# Patient Record
Sex: MALE | Race: WHITE | NOT HISPANIC OR LATINO | URBAN - METROPOLITAN AREA
[De-identification: names, ages, dates, MRNs, and addresses within clinical notes are randomized per-mention and may not be internally consistent; named-entity substitution may affect disease eponyms.]

---
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
## 2025-07-08 ENCOUNTER — OFFICE VISIT (OUTPATIENT)
Dept: PEDIATRIC NEUROLOGY | Facility: CLINIC | Age: 20
End: 2025-07-08
Payer: COMMERCIAL

## 2025-07-08 VITALS — BODY MASS INDEX: 20.1 KG/M2 | HEIGHT: 70 IN | WEIGHT: 140.44 LBS

## 2025-07-08 DIAGNOSIS — R25.1 TREMOR: ICD-10-CM

## 2025-07-08 DIAGNOSIS — G43.701 CHRONIC MIGRAINE WITHOUT AURA WITH STATUS MIGRAINOSUS, NOT INTRACTABLE: Primary | ICD-10-CM

## 2025-07-08 PROCEDURE — G2211 COMPLEX E/M VISIT ADD ON: HCPCS | Mod: S$GLB,,, | Performed by: STUDENT IN AN ORGANIZED HEALTH CARE EDUCATION/TRAINING PROGRAM

## 2025-07-08 PROCEDURE — 1159F MED LIST DOCD IN RCRD: CPT | Mod: CPTII,S$GLB,, | Performed by: STUDENT IN AN ORGANIZED HEALTH CARE EDUCATION/TRAINING PROGRAM

## 2025-07-08 PROCEDURE — 99999 PR PBB SHADOW E&M-EST. PATIENT-LVL III: CPT | Mod: PBBFAC,,, | Performed by: STUDENT IN AN ORGANIZED HEALTH CARE EDUCATION/TRAINING PROGRAM

## 2025-07-08 PROCEDURE — 1160F RVW MEDS BY RX/DR IN RCRD: CPT | Mod: CPTII,S$GLB,, | Performed by: STUDENT IN AN ORGANIZED HEALTH CARE EDUCATION/TRAINING PROGRAM

## 2025-07-08 PROCEDURE — 3008F BODY MASS INDEX DOCD: CPT | Mod: CPTII,S$GLB,, | Performed by: STUDENT IN AN ORGANIZED HEALTH CARE EDUCATION/TRAINING PROGRAM

## 2025-07-08 PROCEDURE — 99214 OFFICE O/P EST MOD 30 MIN: CPT | Mod: S$GLB,,, | Performed by: STUDENT IN AN ORGANIZED HEALTH CARE EDUCATION/TRAINING PROGRAM

## 2025-07-08 RX ORDER — SUMATRIPTAN SUCCINATE 50 MG/1
50 TABLET ORAL DAILY PRN
Qty: 9 TABLET | Refills: 3 | Status: SHIPPED | OUTPATIENT
Start: 2025-07-08 | End: 2025-08-07

## 2025-07-08 RX ORDER — TOPIRAMATE 50 MG/1
50 TABLET, FILM COATED ORAL 2 TIMES DAILY
Qty: 60 TABLET | Refills: 3 | Status: SHIPPED | OUTPATIENT
Start: 2025-07-08

## 2025-07-08 NOTE — PATIENT INSTRUCTIONS
Acute abortive treatment:     When migraine symptoms first develop, the patient should rest or sleep in a dark, quiet room with a cool cloth applied to forehead if possible. Early use of medication during the migraine attack, when the headache is still mild, is important      Step 1: For mild headaches or as first step in treatment, give ibuprofen solution or tablet 600MG every 4 to 6 hours as needed (max 4 doses in 24 hours)               -Limit to 14 days per month maximum to avoid medication overuse headache               -If this medication proves ineffective, would next try naproxen sodium tablet 375mg every 8 to 12 hours as needed (max daily dose 1000mg)      Step 2: If step 1 medication does not get rid of headache, or if headache is severe from the start, also give sumatriptan 50mg oral tablet              -This dose may be repeated a second time if headache still remains after 2 hours, with maximum of 2 doses per 24 hours               -Limit use to 9 days per month to avoid medication overuse headache               -You may combine this medication with ibuprofen for better effect if it is only somewhat effective               - Side effects may include chest pain/pressure/tightness, hot/cold flashes, sore throat, fatigue, feeling of heaviness, tingling, jaw pain/pressure, neck pain               -If this medication proves ineffective, would next try NURTEC 75mg ODT;         Daily preventive treatment     Given that this patient has frequent or long-lasting migraines, migraines that cause significant disability, contraindication or failure of acute medication, or medication overuse headache, will initiate prevention at this time with:  1) riboflavin (vitamin B2) 200mg twice daily. This may cause stomach upset if taken on empty stomach. It can cause bright yellow or yellow-orange discoloration of urine   2) elemental magnesium or magnesium oxide 200mg twice daily. May cause diarrhea       3) Topiramate  Week  1: Take 1/2 tab (25mg) every morning   Week 2: Take 1/2 tab (25mg) every morning and take 1/2 tab (25mg) every night  Week 3: Take 1 tab (50mg) every morning and take 1/2 tab (25mg) every night  Week 4: Take 1 tab (50mg) every morning and take 1 tab (50mg) every night and continue this dose       They have previously tried 0 other preventive medications which were stopped for either side effects or lack of efficacy               -Should be continued for at least 6-8 weeks before determining effectiveness               -Headache diary should be maintained so that frequency of headaches can be compared once on the medication              -If this proves ineffective or side effects are not tolerated, would next try propanolol      . Side effects may include low heart rate or blood pressure, nightmares, decreased exercise tolerance, or emotional disturbances.                -If medication proves effective, it should be continued for at least 6-12 months before considering to wean medication      Lifestyle measures   Education: Check out [employer] for more education on headaches, a website created by pediatric headache specialists   Sleep: Work on getting sufficient sleep along with keeping relatively constant bedtime and wake-up times on weekdays and weekends  Exercise: Regular exercise for at least 30 minutes a day for 5 days a week may decrease frequency of headaches   Hydration: Aim to drink at least 64 ounces of water every day, ideally 80 ounces. Carry a water bottle around to school to make this easier   Meals: Avoid fasting or skipping meals because this may trigger headaches      Utilize mychart to notify office of side effects, effects of acute medications after 2-3 tries, effects of preventive medications after 6-8 weeks     Return to clinic in 3 months for reassessment

## 2025-07-08 NOTE — PROGRESS NOTES
Subjective:      Patient ID: Chandler White is a 19 y.o. male here for   Chief Complaint   Patient presents with    Migraine        Interim hx 2:     Current HA freq: 15 days out of last 30, with 7 days considered bad/severe  Last HA freq: 30 days out of prior 30d, with 9 days considered bad/severe     Current acute: ibuprofen/rizatriptan  Current preventive: propranolol 40mg BID - stopped few months ago wasn't helpful       Interim hx:   Tremors are same as prior, involving hand. Same triggers as before     30/30 headaches past month - 9 bad;   +Photophobia -phonophobia, rarely nausea - can last 3-4 hours;     Sometimes short bursts of pain in the occipital area;     Sometimes ibuprofen 2-3 tabs or benadryl - only somewhat effective;    Initial HPI:  16yoM with hand tremors since late 2021, possibly before this.     He has a tremor in hands and feet bilaterally. He notices it most when focusing on something, reaching for something slowly, slowly walking down the stairs. He sometimes feels weak when picking up something heavy. Tells a story of picking up a cinderblock and it felt like his arms wanted to give out. Has not noticed this during sleep. No issues with gait. Some issues with sleep.     Mother thinks that he is a bit high strung and wonders if this could be anxiety.     Rare intake of caffeine. Stopped this and vaping nicotine and didn't notice a difference.     No history of seizure.     Has a slight headache when waking up about 4 times per week, always on the R temple. Tension type not throbbing.         Prior labwork by PCP was overall unremarkable.     Brother with pontocerebellar hypoplasia type 2A due to the   homozygous TFEN [id number] mutation.  PCH-2A is a neurodevelopmental disorder with   generalized clonus, incoordination of sucking, swallowing, impaired motor and   cognitive development with lack of voluntary motor development, central visual   impairment and increased risk of malignant hyperthermia.   Epilepsy is present in   approximately 50% of the patients      Birth history: born 37wks  for large head; No issues with pregnancy or delivery   Developmental history: Met all milestones  Family history: pontocerebellar hypoplasia in brother;   Social history: Lives with mother, father, sister, brother   School/therapy history: 11th grade; gets all As;     Current Outpatient Medications   Medication Instructions    ketoconazole (NIZORAL) 2 % cream 1 application , Daily    mupirocin (BACTROBAN) 1 g, 3 times daily    permethrin (ELIMITE) 5 % cream 1 application , Daily    propranoloL (INDERAL) 40 mg, Oral, 2 times daily    rizatriptan (MAXALT) 10 mg, Oral, As needed (PRN)    sulfamethoxazole-trimethoprim 800-160mg (BACTRIM DS) 800-160 mg Tab 1 tablet, 2 times daily    terbinafine HCL (LAMISIL) 250 mg, Daily        Review of Systems   Constitutional:  Negative for fever and unexpected weight change.   HENT:  Negative for hearing loss and trouble swallowing.    Eyes:  Positive for visual disturbance.   Respiratory:  Negative for cough and shortness of breath.    Cardiovascular:  Negative for chest pain and palpitations.   Gastrointestinal:  Negative for abdominal pain, constipation, diarrhea, nausea and vomiting.   Genitourinary:  Negative for difficulty urinating.   Musculoskeletal:  Negative for gait problem and neck pain.   Skin:  Negative for rash.   Allergic/Immunologic: Negative for environmental allergies.   Neurological:  Positive for tremors and headaches. Negative for dizziness, seizures, syncope, speech difficulty, weakness, light-headedness and numbness.   Hematological:  Does not bruise/bleed easily.   Psychiatric/Behavioral:  Positive for sleep disturbance. Negative for confusion. The patient is nervous/anxious.        Objective:   Neurologic Exam     Mental Status   Oriented to person, place, and time.   Registration: recalls 3 of 3 objects. Recall at 5 minutes: recalls 3 of 3 objects. Follows 2  "step commands.   Attention: normal. Concentration: normal.   Speech: speech is normal   Level of consciousness: alert  Knowledge: good.     Cranial Nerves     CN II   Visual fields full to confrontation.     CN III, IV, VI   Pupils are equal, round, and reactive to light.  Extraocular motions are normal.   Nystagmus: none   Diplopia: none    CN V   Facial sensation intact.     CN VII   Facial expression full, symmetric.     CN VIII   Hearing: intact    CN IX, X   Palate: symmetric    CN XI   Right sternocleidomastoid strength: normal  Left sternocleidomastoid strength: normal  Right trapezius strength: normal  Left trapezius strength: normal    CN XII   Tongue deviation: none    Motor Exam   Muscle bulk: normal  Overall muscle tone: normal    Strength   Strength 5/5 throughout.     Sensory Exam   Light touch normal.     Gait, Coordination, and Reflexes     Gait  Gait: normal    Coordination   Finger to nose coordination: normal  Heel to shin coordination: normal  Tandem walking coordination: normal    Tremor   Action tremor: right arm and left arm (fine tremor of hands when outstretched, R>L)    Reflexes   Right brachioradialis: 2+  Left brachioradialis: 2+  Right biceps: 2+  Left biceps: 2+  Right triceps: 2+  Left triceps: 2+  Right patellar: 3+  Left patellar: 3+  Right achilles: 2+  Left achilles: 2+  Right plantar: normal  Left plantar: normal  Right ankle clonus: absent  Left ankle clonus: absent  Ht 5' 9.72" (1.771 m)   Wt 63.7 kg (140 lb 6.9 oz)   BMI 20.31 kg/m²      Physical Exam  Vitals reviewed.   Constitutional:       Appearance: Normal appearance.   HENT:      Head: Normocephalic.      Nose: Nose normal.      Mouth/Throat:      Mouth: Mucous membranes are moist.   Eyes:      Conjunctiva/sclera: Conjunctivae normal.      Pupils: Pupils are equal, round, and reactive to light.   Cardiovascular:      Rate and Rhythm: Normal rate and regular rhythm.   Pulmonary:      Effort: Pulmonary effort is normal. " No respiratory distress.   Abdominal:      General: There is no distension.      Palpations: Abdomen is soft.   Musculoskeletal:         General: No swelling. Normal range of motion.      Cervical back: Normal range of motion. No tenderness.   Skin:     Findings: No rash.   Neurological:      Mental Status: He is alert and oriented to person, place, and time.      Coordination: Finger-Nose-Finger Test normal.      Gait: Gait is intact. Tandem walk normal.      Deep Tendon Reflexes:      Reflex Scores:       Tricep reflexes are 2+ on the right side and 2+ on the left side.       Bicep reflexes are 2+ on the right side and 2+ on the left side.       Brachioradialis reflexes are 2+ on the right side and 2+ on the left side.       Patellar reflexes are 3+ on the right side and 3+ on the left side.       Achilles reflexes are 2+ on the right side and 2+ on the left side.  Psychiatric:         Mood and Affect: Mood normal.         Speech: Speech normal.         Behavior: Behavior normal.       Assessment:     Chandler is a 19 Years old male with no significant PMHx who presents for evaluation of tremor. His tremor is generalized and includes hands and feet, and seems to fit picture for action tremor, either a simple kinetic tremor or perhaps an isometic tremor. He does not clearly fit the picture for essential tremor and there is not a family history of this. He does have a brother with pontocerebellar hypoplasia, possible if he is a carrier may have mild symptoms related to this but imaging was normal. Overall tremor not overly disruptive for patient so will monitor off treatment for now.    This patient meets criteria for a diagnosis of Episodic Migraine w/o aura due to the following:    Recurrent (at least 5) episodes of moderate to severe head pain lasting (2 or more) hours and accompanied by:  - Nausea and/or vomiting  - Photophobia  - Phonophobia     Given that headaches are still frequent and propranolol failed to  help this and tremor, will instead start daily topiramate to help both of these issues and reassess. He has tried a few first line triptans without great response so will also submit for nurtec as acute med given patient over 17yo    Plan:     Plan:     Acute abortive treatment:    When migraine symptoms first develop, the patient should rest or sleep in a dark, quiet room with a cool cloth applied to forehead if possible. Early use of medication during the migraine attack, when the headache is still mild, is important     Step 1: For mild headaches or as first step in treatment, give ibuprofen solution or tablet 600MG every 4 to 6 hours as needed (max 4 doses in 24 hours)    -Limit to 14 days per month maximum to avoid medication overuse headache    -If this medication proves ineffective, would next try naproxen sodium tablet 375mg every 8 to 12 hours as needed (max daily dose 1000mg)     Step 2: If step 1 medication does not get rid of headache, or if headache is severe from the start, also give sumatriptan 50mg oral tablet   -This dose may be repeated a second time if headache still remains after 2 hours, with maximum of 2 doses per 24 hours    -Limit use to 9 days per month to avoid medication overuse headache    -You may combine this medication with ibuprofen for better effect if it is only somewhat effective    - Side effects may include chest pain/pressure/tightness, hot/cold flashes, sore throat, fatigue, feeling of heaviness, tingling, jaw pain/pressure, neck pain    -If this medication proves ineffective, would next try NURTEC 75mg ODT;       Daily preventive treatment    Given that this patient has frequent or long-lasting migraines, migraines that cause significant disability, contraindication or failure of acute medication, or medication overuse headache, will initiate prevention at this time with:  1) riboflavin (vitamin B2) 200mg twice daily. This may cause stomach upset if taken on empty stomach. It  can cause bright yellow or yellow-orange discoloration of urine   2) elemental magnesium or magnesium oxide 200mg twice daily. May cause diarrhea      3) Topiramate  Week 1: Take 1/2 tab (25mg) every morning   Week 2: Take 1/2 tab (25mg) every morning and take 1/2 tab (25mg) every night  Week 3: Take 1 tab (50mg) every morning and take 1/2 tab (25mg) every night  Week 4: Take 1 tab (50mg) every morning and take 1 tab (50mg) every night and continue this dose      They have previously tried 0 other preventive medications which were stopped for either side effects or lack of efficacy    -Should be continued for at least 6-8 weeks before determining effectiveness    -Headache diary should be maintained so that frequency of headaches can be compared once on the medication   -If this proves ineffective or side effects are not tolerated, would next try propanolol     . Side effects may include low heart rate or blood pressure, nightmares, decreased exercise tolerance, or emotional disturbances.     -If medication proves effective, it should be continued for at least 6-12 months before considering to wean medication     Lifestyle measures   Education: Check out [employer] for more education on headaches, a website created by pediatric headache specialists   Sleep: Work on getting sufficient sleep along with keeping relatively constant bedtime and wake-up times on weekdays and weekends  Exercise: Regular exercise for at least 30 minutes a day for 5 days a week may decrease frequency of headaches   Hydration: Aim to drink at least 64 ounces of water every day, ideally 80 ounces. Carry a water bottle around to school to make this easier   Meals: Avoid fasting or skipping meals because this may trigger headaches     Utilize mychart to notify office of side effects, effects of acute medications after 2-3 tries, effects of preventive medications after 6-8 weeks    Return to clinic in 3 months for reassessment        Stef Macias MD  [facility]